# Patient Record
(demographics unavailable — no encounter records)

---

## 2024-11-25 NOTE — HISTORY OF PRESENT ILLNESS
[de-identified] : HPI: Margarito has been exercising - happening a lot more in the summer - frequent, obsessive, obsessive with the obando itself, idea of growing muscles, bulking up. Never wanted to lose weight, was fine the way he was, was a healthy weight. With the obsession with working out, bulking up, the exercising became obsessive, then body image focus is a very good student, does well in school, honor classes. Last year in 9th grade, grades were very good, wasn't giving himself a break, was anxious about school. They signed up for a gym - would go with dad and he would supervise up until end of school year, then during the summer was very important to him. He decided he wants to build muscle, but became "too thin." Then started working out many hours, then would come home and would feel that it wasn't enough. Was eating, but would be picky about what he eats, would exclude the foods that he decided were not healthy. Was constantly researching on his phone nutrition facts of foods. Was going long stretches between meals. Now currently not looking at calories, but before was looking at amount of sugar, also amount of saturated fats - over the summer until September. Wanted to make sure that his meals have more protein than anything else. In October, November has gotten much better. Has been eating more than over the summer. Mom has mostly been preparing the meals. Was avoiding meals outside, but lately has gotten much better. Was weighing himself at home, now is weighing himself everyday. Mom reports that more recently, she has been giving him more food at home and he has been completing.   Before: was at the gym for 2-3 hours 4-5 times per week, would come home and then feel that he didn't do enough, gave him anxiety. Saw pediatrician at end of August, closed gym membership. Then would want to go exercise, would go to the park after school. Would be there for 40 minutes. His uncle started supervising - take him to his own gym with supervision 3x/week. Did that with uncle for a while, was supposed to be eating more and gaining weight. Sept October etc. Has been very antsy. Has been jogging instead of walking. Teachers have reached out to mom. Has been experiencing some dizziness. Now is not eating butter, certain cheeses. Started seeing the pediatrician weekly in the summer, now he is being seen every couple of weeks. Mother reports he is not exercising anymore at home. Of note, he has abrasions on his knuckles that parents report is from lifting chairs.   Patient was seen in the ER 11/15/24, vitals stable and EKG HR 65. Hgb 11.1, increased RDW, mildly elevated LFTs though the specimen was hemolyzed.  Parents wanted to go home and see how things go at home, appt was moved up to today.   Calorie goal: no per patient  Weight goal: none  Exercise: see above  Purging: no  Laxatives: none   24 hour recall:  Lunch: had salmon lox avocado sandwich - whole grain bread, and gave him a smoothie - fruits,  whole milk, and protein powder, and peanut butter.  School: chicken bowl with rice, corn, peas.  Shake around 4:30 PM  Dinner: spaghetti with turkey meatballs, salad, then a peanut butter and jelly sandwich snack: shake  Breakfast: 2 sunnyside up eggs, apple, a bit of cake, toast   Psychologist: seeing for anxiety Psychiatric eval: interviewed parents Dr. Dario Lucia - go to [facility] and do eval there.   PMH: none, no hospitalizations  Meds: none, sometimes melatonin   All: none  PSH: none  FHx: no history of thyroid, IBD, celiac   H: feels safe at home, lives with parents, older brother  E: in 10th grade. no bullying/cyberbullying. Has friends A: for fun likes to spend time with friends  D: no drug, alcohol, tobacco, or other recreational drug use  S: Never sexually active. S: no SI/HI/NSSI. See above regarding therapy/psychiatry  [de-identified] : 122 [de-identified] : May 2024 [de-identified] : 92 [de-identified] : current [de-identified] : 92

## 2024-11-25 NOTE — PHYSICAL EXAM
[Normal] : deep tendon reflexes were 2+ and symmetric [de-identified] : cachectic appearance, patient appears restless, doing push-ups on exam room floor  [de-identified] : hyperpigmented areas on patient's chest, abrasions on patient's knuckles

## 2024-11-25 NOTE — ASSESSMENT
[FreeTextEntry1] : Margarito is a 15 y/o M with no significant PMH who presents today for evaluation of weight loss since the summer of 30 lbs. Patient has lost significant amount of weight over short period of time, going from 50th percentile down to 1st percentile for patient's weight. Though patient's mother reports "the exercise has stopped at home." Patient noted to be exercising in the exam room today. Although patient's vital signs stable today, discussed admission to the hospital in order to break the cycle of patient's excessive exercise and ensure he is getting the nutrition he needs. Spoke extensively with patient's parents regarding our recommendation. Parents prefer to take him home today and continue working on things at home and return for an appt in a few days. Discussed if there are any concerns about him before our next appointment that they should bring him to the emergency room for evaluation.   I believe MARGARITO meets the DSM-5 diagnostic criteria for anorexia nervosa, restricting type/binge-eating/purging type, as he demonstrates restriction of energy/food intake relative to requirements, leading to a significantly low body weight, fear of weight gain, and dysmorphic body image. The level of severity is: moderate (BMI 16-16.99).  Plan  - Psychoeducation on eating disorders and malnutrition provided to patient and family. Discussed the physical effects and medical complications of malnutrition. Discussed adverse consequences of malnutrition on mental health, including worsening of anxiety and depression. - Nutrition recommendations provided by RD today.  - Discussed multidisciplinary team approach to treatment of eating disorders, including a medical provider, RD, and therapist +/- psychiatrist for medication management. Discussed role of medications in managing comorbid conditions such as anxiety, depression, and/or OCD. - will obtain additional lab-work other than what was obtained in the ER last week, as well as repeat LFTs and CBC.  - Patient has hyperpigmentation on his chest possibly from dry skin vs. tinea versicolor, will continue to monitor.  - All questions answered. Support provided. - RTC next week to meet with me and nutritionist.

## 2024-11-25 NOTE — HISTORY OF PRESENT ILLNESS
[de-identified] : HPI: Margarito has been exercising - happening a lot more in the summer - frequent, obsessive, obsessive with the obando itself, idea of growing muscles, bulking up. Never wanted to lose weight, was fine the way he was, was a healthy weight. With the obsession with working out, bulking up, the exercising became obsessive, then body image focus is a very good student, does well in school, honor classes. Last year in 9th grade, grades were very good, wasn't giving himself a break, was anxious about school. They signed up for a gym - would go with dad and he would supervise up until end of school year, then during the summer was very important to him. He decided he wants to build muscle, but became "too thin." Then started working out many hours, then would come home and would feel that it wasn't enough. Was eating, but would be picky about what he eats, would exclude the foods that he decided were not healthy. Was constantly researching on his phone nutrition facts of foods. Was going long stretches between meals. Now currently not looking at calories, but before was looking at amount of sugar, also amount of saturated fats - over the summer until September. Wanted to make sure that his meals have more protein than anything else. In October, November has gotten much better. Has been eating more than over the summer. Mom has mostly been preparing the meals. Was avoiding meals outside, but lately has gotten much better. Was weighing himself at home, now is weighing himself everyday. Mom reports that more recently, she has been giving him more food at home and he has been completing.   Before: was at the gym for 2-3 hours 4-5 times per week, would come home and then feel that he didn't do enough, gave him anxiety. Saw pediatrician at end of August, closed gym membership. Then would want to go exercise, would go to the park after school. Would be there for 40 minutes. His uncle started supervising - take him to his own gym with supervision 3x/week. Did that with uncle for a while, was supposed to be eating more and gaining weight. Sept October etc. Has been very antsy. Has been jogging instead of walking. Teachers have reached out to mom. Has been experiencing some dizziness. Now is not eating butter, certain cheeses. Started seeing the pediatrician weekly in the summer, now he is being seen every couple of weeks. Mother reports he is not exercising anymore at home. Of note, he has abrasions on his knuckles that parents report is from lifting chairs.   Patient was seen in the ER 11/15/24, vitals stable and EKG HR 65. Hgb 11.1, increased RDW, mildly elevated LFTs though the specimen was hemolyzed.  Parents wanted to go home and see how things go at home, appt was moved up to today.   Calorie goal: no per patient  Weight goal: none  Exercise: see above  Purging: no  Laxatives: none   24 hour recall:  Lunch: had salmon lox avocado sandwich - whole grain bread, and gave him a smoothie - fruits,  whole milk, and protein powder, and peanut butter.  School: chicken bowl with rice, corn, peas.  Shake around 4:30 PM  Dinner: spaghetti with turkey meatballs, salad, then a peanut butter and jelly sandwich snack: shake  Breakfast: 2 sunnyside up eggs, apple, a bit of cake, toast   Psychologist: seeing for anxiety Psychiatric eval: interviewed parents Dr. Dario Lucia - go to [facility] and do eval there.   PMH: none, no hospitalizations  Meds: none, sometimes melatonin   All: none  PSH: none  FHx: no history of thyroid, IBD, celiac   H: feels safe at home, lives with parents, older brother  E: in 10th grade. no bullying/cyberbullying. Has friends A: for fun likes to spend time with friends  D: no drug, alcohol, tobacco, or other recreational drug use  S: Never sexually active. S: no SI/HI/NSSI. See above regarding therapy/psychiatry  [de-identified] : 122 [de-identified] : May 2024 [de-identified] : 92 [de-identified] : current [de-identified] : 92

## 2024-11-25 NOTE — END OF VISIT
[] : Fellow [FreeTextEntry3] : Reviewed with parents our concern about patient's weight loss and difficulty for him to stop exercising (was noted to be exercising in exam room). Parents would like to give him a few days at home to see how he responds to discussion today. Patient with normal vital signs, normal EKG in ED last week, and reported same weight at PMD last week. Reviewed if any concerns should see MD or go to ER.

## 2024-11-25 NOTE — PHYSICAL EXAM
[Normal] : deep tendon reflexes were 2+ and symmetric [de-identified] : cachectic appearance, patient appears restless, doing push-ups on exam room floor  [de-identified] : hyperpigmented areas on patient's chest, abrasions on patient's knuckles

## 2024-11-25 NOTE — HISTORY OF PRESENT ILLNESS
[de-identified] : HPI: Margarito has been exercising - happening a lot more in the summer - frequent, obsessive, obsessive with the obando itself, idea of growing muscles, bulking up. Never wanted to lose weight, was fine the way he was, was a healthy weight. With the obsession with working out, bulking up, the exercising became obsessive, then body image focus is a very good student, does well in school, honor classes. Last year in 9th grade, grades were very good, wasn't giving himself a break, was anxious about school. They signed up for a gym - would go with dad and he would supervise up until end of school year, then during the summer was very important to him. He decided he wants to build muscle, but became "too thin." Then started working out many hours, then would come home and would feel that it wasn't enough. Was eating, but would be picky about what he eats, would exclude the foods that he decided were not healthy. Was constantly researching on his phone nutrition facts of foods. Was going long stretches between meals. Now currently not looking at calories, but before was looking at amount of sugar, also amount of saturated fats - over the summer until September. Wanted to make sure that his meals have more protein than anything else. In October, November has gotten much better. Has been eating more than over the summer. Mom has mostly been preparing the meals. Was avoiding meals outside, but lately has gotten much better. Was weighing himself at home, now is weighing himself everyday. Mom reports that more recently, she has been giving him more food at home and he has been completing.   Before: was at the gym for 2-3 hours 4-5 times per week, would come home and then feel that he didn't do enough, gave him anxiety. Saw pediatrician at end of August, closed gym membership. Then would want to go exercise, would go to the park after school. Would be there for 40 minutes. His uncle started supervising - take him to his own gym with supervision 3x/week. Did that with uncle for a while, was supposed to be eating more and gaining weight. Sept October etc. Has been very antsy. Has been jogging instead of walking. Teachers have reached out to mom. Has been experiencing some dizziness. Now is not eating butter, certain cheeses. Started seeing the pediatrician weekly in the summer, now he is being seen every couple of weeks. Mother reports he is not exercising anymore at home. Of note, he has abrasions on his knuckles that parents report is from lifting chairs.   Patient was seen in the ER 11/15/24, vitals stable and EKG HR 65. Hgb 11.1, increased RDW, mildly elevated LFTs though the specimen was hemolyzed.  Parents wanted to go home and see how things go at home, appt was moved up to today.   Calorie goal: no per patient  Weight goal: none  Exercise: see above  Purging: no  Laxatives: none   24 hour recall:  Lunch: had salmon lox avocado sandwich - whole grain bread, and gave him a smoothie - fruits,  whole milk, and protein powder, and peanut butter.  School: chicken bowl with rice, corn, peas.  Shake around 4:30 PM  Dinner: spaghetti with turkey meatballs, salad, then a peanut butter and jelly sandwich snack: shake  Breakfast: 2 sunnyside up eggs, apple, a bit of cake, toast   Psychologist: seeing for anxiety Psychiatric eval: interviewed parents Dr. Dario Lucia - go to [facility] and do eval there.   PMH: none, no hospitalizations  Meds: none, sometimes melatonin   All: none  PSH: none  FHx: no history of thyroid, IBD, celiac   H: feels safe at home, lives with parents, older brother  E: in 10th grade. no bullying/cyberbullying. Has friends A: for fun likes to spend time with friends  D: no drug, alcohol, tobacco, or other recreational drug use  S: Never sexually active. S: no SI/HI/NSSI. See above regarding therapy/psychiatry  [de-identified] : 122 [de-identified] : May 2024 [de-identified] : 92 [de-identified] : current [de-identified] : 92

## 2024-11-25 NOTE — PHYSICAL EXAM
[Normal] : deep tendon reflexes were 2+ and symmetric [de-identified] : cachectic appearance, patient appears restless, doing push-ups on exam room floor  [de-identified] : hyperpigmented areas on patient's chest, abrasions on patient's knuckles

## 2024-12-03 NOTE — ASSESSMENT
[FreeTextEntry1] : Margarito is a 15 y/o M with history of malnutrition and excessive exercise who presents for follow-up. Weight slightly down from last week. As discussed at the last visit, it was recommended that he go to the ER for admission. Parents wanted to see if he could improve over the last few days. Parents now on board with admission as he has not gained weight over past few days. Will update the inpatient team and call the ER. Parents expressed understanding about medical admission and all questions were answered.

## 2024-12-03 NOTE — HISTORY OF PRESENT ILLNESS
[de-identified] : Margarito is a 15 y/o M with 30 lb weight loss since the summer 2024 secondary to restriction and excessive exercise who presents for follow-up for malnutrition.   Interval history: Weight down from last visit. Mom reports that he has been eating, is "surprised to see that the weight is down." Mom reports he has returned from school sweaty. Over the weekend he was able to go out to dinner with them and she did notice a difference, but otherwise has still been struggle at home.   Patient reports that he can eat more but wants to continue to exercise.    Psychiatry:  Therapy:  [de-identified] : 122 [de-identified] : May 2024  [de-identified] : 92 [de-identified] : 11/2024 [de-identified] : 92

## 2024-12-03 NOTE — HISTORY OF PRESENT ILLNESS
[de-identified] : Margarito is a 15 y/o M with 30 lb weight loss since the summer 2024 secondary to restriction and excessive exercise who presents for follow-up for malnutrition.   Interval history: Weight down from last visit. Mom reports that he has been eating, is "surprised to see that the weight is down." Mom reports he has returned from school sweaty. Over the weekend he was able to go out to dinner with them and she did notice a difference, but otherwise has still been struggle at home.   Patient reports that he can eat more but wants to continue to exercise.    Psychiatry:  Therapy:  [de-identified] : 122 [de-identified] : May 2024  [de-identified] : 92 [de-identified] : 11/2024 [de-identified] : 92

## 2024-12-03 NOTE — HISTORY OF PRESENT ILLNESS
[de-identified] : Margarito is a 15 y/o M with 30 lb weight loss since the summer 2024 secondary to restriction and excessive exercise who presents for follow-up for malnutrition.   Interval history: Weight down from last visit. Mom reports that he has been eating, is "surprised to see that the weight is down." Mom reports he has returned from school sweaty. Over the weekend he was able to go out to dinner with them and she did notice a difference, but otherwise has still been struggle at home.   Patient reports that he can eat more but wants to continue to exercise.    Psychiatry:  Therapy:  [de-identified] : 122 [de-identified] : May 2024  [de-identified] : 92 [de-identified] : 11/2024 [de-identified] : 92

## 2024-12-03 NOTE — PHYSICAL EXAM
[de-identified] : lanugo present on patient's back, acrocyanosis present. Lesions on patient's knuckles continue to be present.

## 2024-12-03 NOTE — PHYSICAL EXAM
[de-identified] : lanugo present on patient's back, acrocyanosis present. Lesions on patient's knuckles continue to be present.

## 2024-12-03 NOTE — PHYSICAL EXAM
[de-identified] : lanugo present on patient's back, acrocyanosis present. Lesions on patient's knuckles continue to be present.

## 2024-12-03 NOTE — PHYSICAL EXAM
[de-identified] : lanugo present on patient's back, acrocyanosis present. Lesions on patient's knuckles continue to be present.

## 2024-12-03 NOTE — HISTORY OF PRESENT ILLNESS
[de-identified] : Margarito is a 15 y/o M with 30 lb weight loss since the summer 2024 secondary to restriction and excessive exercise who presents for follow-up for malnutrition.   Interval history: Weight down from last visit. Mom reports that he has been eating, is "surprised to see that the weight is down." Mom reports he has returned from school sweaty. Over the weekend he was able to go out to dinner with them and she did notice a difference, but otherwise has still been struggle at home.   Patient reports that he can eat more but wants to continue to exercise.    Psychiatry:  Therapy:  [de-identified] : 122 [de-identified] : May 2024  [de-identified] : 92 [de-identified] : 11/2024 [de-identified] : 92